# Patient Record
Sex: MALE | Race: WHITE | NOT HISPANIC OR LATINO | Employment: OTHER | URBAN - METROPOLITAN AREA
[De-identification: names, ages, dates, MRNs, and addresses within clinical notes are randomized per-mention and may not be internally consistent; named-entity substitution may affect disease eponyms.]

---

## 2017-01-06 ENCOUNTER — AMBULATORY - HEALTHEAST (OUTPATIENT)
Dept: CARDIOLOGY | Facility: CLINIC | Age: 62
End: 2017-01-06

## 2017-01-06 DIAGNOSIS — I48.3 TYPICAL ATRIAL FLUTTER (H): ICD-10-CM

## 2017-03-10 ENCOUNTER — COMMUNICATION - HEALTHEAST (OUTPATIENT)
Dept: CARDIOLOGY | Facility: CLINIC | Age: 62
End: 2017-03-10

## 2017-03-10 DIAGNOSIS — I48.3 TYPICAL ATRIAL FLUTTER (H): ICD-10-CM

## 2017-06-12 ENCOUNTER — COMMUNICATION - HEALTHEAST (OUTPATIENT)
Dept: CARDIOLOGY | Facility: CLINIC | Age: 62
End: 2017-06-12

## 2017-06-12 DIAGNOSIS — I48.3 TYPICAL ATRIAL FLUTTER (H): ICD-10-CM

## 2017-09-20 ENCOUNTER — COMMUNICATION - HEALTHEAST (OUTPATIENT)
Dept: CARDIOLOGY | Facility: CLINIC | Age: 62
End: 2017-09-20

## 2017-09-20 DIAGNOSIS — I48.3 TYPICAL ATRIAL FLUTTER (H): ICD-10-CM

## 2017-11-06 ENCOUNTER — COMMUNICATION - HEALTHEAST (OUTPATIENT)
Dept: CARDIOLOGY | Facility: CLINIC | Age: 62
End: 2017-11-06

## 2017-11-06 DIAGNOSIS — I48.3 TYPICAL ATRIAL FLUTTER (H): ICD-10-CM

## 2017-11-09 ENCOUNTER — COMMUNICATION - HEALTHEAST (OUTPATIENT)
Dept: CARDIOLOGY | Facility: CLINIC | Age: 62
End: 2017-11-09

## 2017-11-09 DIAGNOSIS — I48.3 TYPICAL ATRIAL FLUTTER (H): ICD-10-CM

## 2017-12-19 ENCOUNTER — OFFICE VISIT - HEALTHEAST (OUTPATIENT)
Dept: CARDIOLOGY | Facility: CLINIC | Age: 62
End: 2017-12-19

## 2017-12-19 DIAGNOSIS — I10 ESSENTIAL HYPERTENSION: ICD-10-CM

## 2017-12-19 DIAGNOSIS — I48.3 TYPICAL ATRIAL FLUTTER (H): ICD-10-CM

## 2017-12-19 RX ORDER — KETOCONAZOLE 20 MG/G
CREAM TOPICAL PRN
Status: SHIPPED | COMMUNITY
Start: 2017-11-06 | End: 2022-04-21

## 2017-12-19 ASSESSMENT — MIFFLIN-ST. JEOR: SCORE: 1722.93

## 2018-01-26 ENCOUNTER — OFFICE VISIT - HEALTHEAST (OUTPATIENT)
Dept: CARDIOLOGY | Facility: CLINIC | Age: 63
End: 2018-01-26

## 2018-01-26 ENCOUNTER — TRANSFERRED RECORDS (OUTPATIENT)
Dept: HEALTH INFORMATION MANAGEMENT | Facility: CLINIC | Age: 63
End: 2018-01-26

## 2018-01-26 DIAGNOSIS — I10 ESSENTIAL HYPERTENSION: ICD-10-CM

## 2018-01-26 DIAGNOSIS — I48.3 TYPICAL ATRIAL FLUTTER (H): ICD-10-CM

## 2018-01-26 ASSESSMENT — MIFFLIN-ST. JEOR: SCORE: 1763.3

## 2018-02-27 ENCOUNTER — OFFICE VISIT - HEALTHEAST (OUTPATIENT)
Dept: CARDIOLOGY | Facility: CLINIC | Age: 63
End: 2018-02-27

## 2018-02-27 ENCOUNTER — TRANSFERRED RECORDS (OUTPATIENT)
Dept: HEALTH INFORMATION MANAGEMENT | Facility: CLINIC | Age: 63
End: 2018-02-27

## 2018-02-27 DIAGNOSIS — I48.3 TYPICAL ATRIAL FLUTTER (H): ICD-10-CM

## 2018-02-27 DIAGNOSIS — I10 ESSENTIAL HYPERTENSION: ICD-10-CM

## 2018-02-27 ASSESSMENT — MIFFLIN-ST. JEOR: SCORE: 1768.29

## 2018-03-02 ENCOUNTER — TRANSFERRED RECORDS (OUTPATIENT)
Dept: HEALTH INFORMATION MANAGEMENT | Facility: CLINIC | Age: 63
End: 2018-03-02

## 2018-03-02 ENCOUNTER — AMBULATORY - HEALTHEAST (OUTPATIENT)
Dept: CARDIOLOGY | Facility: CLINIC | Age: 63
End: 2018-03-02

## 2018-03-05 ENCOUNTER — AMBULATORY - HEALTHEAST (OUTPATIENT)
Dept: CARDIOLOGY | Facility: CLINIC | Age: 63
End: 2018-03-05

## 2018-03-05 ENCOUNTER — COMMUNICATION - HEALTHEAST (OUTPATIENT)
Dept: CARDIOLOGY | Facility: CLINIC | Age: 63
End: 2018-03-05

## 2018-03-05 ENCOUNTER — SURGERY - HEALTHEAST (OUTPATIENT)
Dept: CARDIOLOGY | Facility: CLINIC | Age: 63
End: 2018-03-05

## 2018-03-05 DIAGNOSIS — I48.3 TYPICAL ATRIAL FLUTTER (H): ICD-10-CM

## 2018-03-12 ENCOUNTER — AMBULATORY - HEALTHEAST (OUTPATIENT)
Dept: CARDIOLOGY | Facility: CLINIC | Age: 63
End: 2018-03-12

## 2018-03-23 ENCOUNTER — SURGERY - HEALTHEAST (OUTPATIENT)
Dept: CARDIOLOGY | Facility: CLINIC | Age: 63
End: 2018-03-23

## 2018-03-23 ASSESSMENT — MIFFLIN-ST. JEOR: SCORE: 1779.25

## 2018-03-27 ENCOUNTER — AMBULATORY - HEALTHEAST (OUTPATIENT)
Dept: CARDIOLOGY | Facility: CLINIC | Age: 63
End: 2018-03-27

## 2018-03-27 DIAGNOSIS — Z86.79 STATUS POST ABLATION OF ATRIAL FLUTTER: ICD-10-CM

## 2018-03-27 DIAGNOSIS — Z98.890 STATUS POST ABLATION OF ATRIAL FLUTTER: ICD-10-CM

## 2018-03-27 ASSESSMENT — MIFFLIN-ST. JEOR: SCORE: 1747.88

## 2018-06-26 ENCOUNTER — OFFICE VISIT - HEALTHEAST (OUTPATIENT)
Dept: CARDIOLOGY | Facility: CLINIC | Age: 63
End: 2018-06-26

## 2018-06-26 DIAGNOSIS — I10 ESSENTIAL HYPERTENSION: ICD-10-CM

## 2018-06-26 DIAGNOSIS — Z98.890 STATUS POST CATHETER ABLATION OF ATRIAL FLUTTER: ICD-10-CM

## 2018-06-26 DIAGNOSIS — E78.2 MIXED HYPERLIPIDEMIA: ICD-10-CM

## 2018-06-26 DIAGNOSIS — I48.3 TYPICAL ATRIAL FLUTTER (H): ICD-10-CM

## 2018-06-26 ASSESSMENT — MIFFLIN-ST. JEOR: SCORE: 1766.02

## 2018-06-27 ENCOUNTER — COMMUNICATION - HEALTHEAST (OUTPATIENT)
Dept: CARDIOLOGY | Facility: CLINIC | Age: 63
End: 2018-06-27

## 2018-06-27 DIAGNOSIS — E78.00 HYPERCHOLESTEREMIA: ICD-10-CM

## 2018-08-02 ENCOUNTER — AMBULATORY - HEALTHEAST (OUTPATIENT)
Dept: LAB | Facility: CLINIC | Age: 63
End: 2018-08-02

## 2018-08-02 ENCOUNTER — HOSPITAL ENCOUNTER (OUTPATIENT)
Dept: CT IMAGING | Facility: CLINIC | Age: 63
Discharge: HOME | End: 2018-08-02
Attending: INTERNAL MEDICINE

## 2018-08-02 DIAGNOSIS — E78.00 HYPERCHOLESTEREMIA: ICD-10-CM

## 2018-08-02 LAB
CHOLEST SERPL-MCNC: 229 MG/DL
CV CALCIUM SCORE AGATSTON LM: 0
CV CALCIUM SCORING AGATSON LAD: 15
CV CALCIUM SCORING AGATSTON CX: 0
CV CALCIUM SCORING AGATSTON RCA: 7
CV CALCIUM SCORING AGATSTON TOTAL: 22
FASTING STATUS PATIENT QL REPORTED: YES
HDLC SERPL-MCNC: 46 MG/DL
LDLC SERPL CALC-MCNC: 145 MG/DL
TRIGL SERPL-MCNC: 188 MG/DL

## 2018-08-27 ENCOUNTER — COMMUNICATION - HEALTHEAST (OUTPATIENT)
Dept: CARDIOLOGY | Facility: CLINIC | Age: 63
End: 2018-08-27

## 2019-01-07 ENCOUNTER — COMMUNICATION - HEALTHEAST (OUTPATIENT)
Dept: CARDIOLOGY | Facility: CLINIC | Age: 64
End: 2019-01-07

## 2019-01-07 DIAGNOSIS — I48.3 TYPICAL ATRIAL FLUTTER (H): ICD-10-CM

## 2019-08-19 ENCOUNTER — COMMUNICATION - HEALTHEAST (OUTPATIENT)
Dept: CARDIOLOGY | Facility: CLINIC | Age: 64
End: 2019-08-19

## 2019-08-19 DIAGNOSIS — I48.3 TYPICAL ATRIAL FLUTTER (H): ICD-10-CM

## 2019-08-23 ENCOUNTER — COMMUNICATION - HEALTHEAST (OUTPATIENT)
Dept: CARDIOLOGY | Facility: CLINIC | Age: 64
End: 2019-08-23

## 2019-08-23 DIAGNOSIS — I48.3 TYPICAL ATRIAL FLUTTER (H): ICD-10-CM

## 2019-10-04 ENCOUNTER — OFFICE VISIT - HEALTHEAST (OUTPATIENT)
Dept: CARDIOLOGY | Facility: CLINIC | Age: 64
End: 2019-10-04

## 2019-10-04 DIAGNOSIS — I48.3 TYPICAL ATRIAL FLUTTER (H): ICD-10-CM

## 2019-10-04 DIAGNOSIS — E78.2 MIXED HYPERLIPIDEMIA: ICD-10-CM

## 2019-10-04 ASSESSMENT — MIFFLIN-ST. JEOR: SCORE: 1752.75

## 2019-10-10 ENCOUNTER — COMMUNICATION - HEALTHEAST (OUTPATIENT)
Dept: CARDIOLOGY | Facility: CLINIC | Age: 64
End: 2019-10-10

## 2019-10-10 DIAGNOSIS — E78.2 MIXED HYPERLIPIDEMIA: ICD-10-CM

## 2019-10-10 LAB
CHOLEST SERPL-MCNC: 200 MG/DL
FASTING STATUS PATIENT QL REPORTED: YES
HDLC SERPL-MCNC: 56 MG/DL
LDLC SERPL CALC-MCNC: 117 MG/DL
TRIGL SERPL-MCNC: 137 MG/DL

## 2019-10-22 ENCOUNTER — COMMUNICATION - HEALTHEAST (OUTPATIENT)
Dept: CARDIOLOGY | Facility: CLINIC | Age: 64
End: 2019-10-22

## 2019-10-25 ENCOUNTER — AMBULATORY - HEALTHEAST (OUTPATIENT)
Dept: CARDIOLOGY | Facility: CLINIC | Age: 64
End: 2019-10-25

## 2019-10-25 DIAGNOSIS — Z00.6 EXAMINATION OF PARTICIPANT OR CONTROL IN CLINICAL RESEARCH: ICD-10-CM

## 2019-10-25 DIAGNOSIS — Z00.6 EXAM FOR CLINICAL TRIAL: ICD-10-CM

## 2019-10-25 DIAGNOSIS — I48.3 TYPICAL ATRIAL FLUTTER (H): ICD-10-CM

## 2019-10-25 LAB
ATRIAL RATE - MUSE: 70 BPM
DIASTOLIC BLOOD PRESSURE - MUSE: NORMAL
INTERPRETATION ECG - MUSE: NORMAL
P AXIS - MUSE: 39 DEGREES
PR INTERVAL - MUSE: 142 MS
QRS DURATION - MUSE: 76 MS
QT - MUSE: 376 MS
QTC - MUSE: 406 MS
R AXIS - MUSE: 5 DEGREES
SYSTOLIC BLOOD PRESSURE - MUSE: NORMAL
T AXIS - MUSE: 20 DEGREES
VENTRICULAR RATE- MUSE: 70 BPM

## 2019-10-25 ASSESSMENT — MIFFLIN-ST. JEOR: SCORE: 1758.76

## 2019-10-28 ENCOUNTER — COMMUNICATION - HEALTHEAST (OUTPATIENT)
Dept: CARDIOLOGY | Facility: CLINIC | Age: 64
End: 2019-10-28

## 2019-10-28 ENCOUNTER — AMBULATORY - HEALTHEAST (OUTPATIENT)
Dept: CARDIOLOGY | Facility: CLINIC | Age: 64
End: 2019-10-28

## 2019-11-07 ENCOUNTER — COMMUNICATION - HEALTHEAST (OUTPATIENT)
Dept: CARDIOLOGY | Facility: CLINIC | Age: 64
End: 2019-11-07

## 2019-11-07 DIAGNOSIS — I48.3 TYPICAL ATRIAL FLUTTER (H): ICD-10-CM

## 2020-01-24 ENCOUNTER — COMMUNICATION - HEALTHEAST (OUTPATIENT)
Dept: CARDIOLOGY | Facility: CLINIC | Age: 65
End: 2020-01-24

## 2020-01-24 DIAGNOSIS — E78.2 MIXED HYPERLIPIDEMIA: ICD-10-CM

## 2020-04-15 ENCOUNTER — COMMUNICATION - HEALTHEAST (OUTPATIENT)
Dept: CARDIOLOGY | Facility: CLINIC | Age: 65
End: 2020-04-15

## 2020-04-15 DIAGNOSIS — I10 ESSENTIAL HYPERTENSION: ICD-10-CM

## 2020-05-06 ENCOUNTER — COMMUNICATION - HEALTHEAST (OUTPATIENT)
Dept: CARDIOLOGY | Facility: CLINIC | Age: 65
End: 2020-05-06

## 2020-05-06 DIAGNOSIS — E78.2 MIXED HYPERLIPIDEMIA: ICD-10-CM

## 2020-07-27 ENCOUNTER — COMMUNICATION - HEALTHEAST (OUTPATIENT)
Dept: CARDIOLOGY | Facility: CLINIC | Age: 65
End: 2020-07-27

## 2020-07-27 DIAGNOSIS — I48.3 TYPICAL ATRIAL FLUTTER (H): ICD-10-CM

## 2020-10-21 ENCOUNTER — OFFICE VISIT - HEALTHEAST (OUTPATIENT)
Dept: CARDIOLOGY | Facility: CLINIC | Age: 65
End: 2020-10-21

## 2020-10-21 DIAGNOSIS — Z98.890 STATUS POST CATHETER ABLATION OF ATRIAL FLUTTER: ICD-10-CM

## 2020-10-21 DIAGNOSIS — E78.2 MIXED HYPERLIPIDEMIA: ICD-10-CM

## 2020-10-21 DIAGNOSIS — I48.3 TYPICAL ATRIAL FLUTTER (H): ICD-10-CM

## 2020-10-21 DIAGNOSIS — R93.1 ELEVATED CORONARY ARTERY CALCIUM SCORE: ICD-10-CM

## 2020-10-21 RX ORDER — ROSUVASTATIN CALCIUM 10 MG/1
TABLET, COATED ORAL
Qty: 90 TABLET | Refills: 3 | Status: SHIPPED | OUTPATIENT
Start: 2020-10-21 | End: 2022-04-21

## 2020-10-21 RX ORDER — METOPROLOL SUCCINATE 50 MG/1
50 TABLET, EXTENDED RELEASE ORAL DAILY
Qty: 90 TABLET | Refills: 3 | Status: SHIPPED | OUTPATIENT
Start: 2020-10-21 | End: 2021-10-25

## 2020-10-21 ASSESSMENT — MIFFLIN-ST. JEOR: SCORE: 1766.02

## 2021-05-31 VITALS — BODY MASS INDEX: 31.1 KG/M2 | HEIGHT: 69 IN | WEIGHT: 210 LBS

## 2021-06-01 VITALS — WEIGHT: 212 LBS | BODY MASS INDEX: 30.35 KG/M2 | HEIGHT: 70 IN

## 2021-06-01 VITALS — WEIGHT: 220 LBS | HEIGHT: 69 IN | BODY MASS INDEX: 32.58 KG/M2

## 2021-06-01 VITALS — WEIGHT: 218.9 LBS | BODY MASS INDEX: 32.42 KG/M2 | HEIGHT: 69 IN

## 2021-06-01 VITALS — HEIGHT: 70 IN | WEIGHT: 216 LBS | BODY MASS INDEX: 30.92 KG/M2

## 2021-06-01 VITALS — BODY MASS INDEX: 31.34 KG/M2 | HEIGHT: 70 IN | WEIGHT: 218.92 LBS

## 2021-06-02 NOTE — TELEPHONE ENCOUNTER
"Subject wanted to know what \"time confirmed might watch on \" in the journal meant.     Eamon Del Castillo  "

## 2021-06-02 NOTE — TELEPHONE ENCOUNTER
Zestar Study Consent Visit    Study description: ECG and PPG Study: Zestar Study      Juan Carlos Huang a 63 y.o. male , was contacted by today to discuss participation in the Zestar study.     The patient called the Clinical Trials Office to inquire about study participation.  The consent form was reviewed with the patient.     The review of the study included:    Study purpose     Conflict of interest    Device description      Study visits    Risks of participation    Benefits (if any)    Alternatives    Voluntary participation    Confidentiality     Compensation/costs of participation    Study stipends    Injury and legal rights    The subject was queried in regards to his willingness to continue and come in for scheduled appointment. his questions were answered to his satisfaction.   The patient has given his preliminary agreement to volunteer to participate in the above noted study.     Plan: Juan Carlos Huang will come to [facility] on 10/25/2019  to continue consent process. If he continues to agrees to participate, the study visit will be done on the same day.    Josey Levin RN

## 2021-06-02 NOTE — TELEPHONE ENCOUNTER
Patient contacted with results of recent lipid panel and recommendations as per Dr. DAVID Fabian for Crestor at 20mg daily dose and f/u lipid panel in 3 months.     He was advised of LDL goal of less than 70 , does not currently tolerate the Crestor 10mg dose, as he is experiencing muscle and joint aches that he attributes to this use.  He states that he will try to increase to the 20mg daily dose, but does not feel it will be a positive experience. Would like to see if Dr. Fabian would look into one of the new cholesterol lowering medications such as Repatha, to see if he could take that and what his cost might be. He is close to changing over to Medicare and so this is a consideration as well.     Dr. Fabian, any new recommendations at this time? sk/RN

## 2021-06-02 NOTE — TELEPHONE ENCOUNTER
----- Message from Higinio Fabian MD sent at 10/10/2019  1:12 PM CDT -----  Gisell,    Would increase Crestor to 20 mg a day and repeat lipid panel in 3 months.    Thanks,    Higinio

## 2021-06-03 VITALS
TEMPERATURE: 98.8 F | RESPIRATION RATE: 15 BRPM | SYSTOLIC BLOOD PRESSURE: 123 MMHG | DIASTOLIC BLOOD PRESSURE: 88 MMHG | BODY MASS INDEX: 33.56 KG/M2 | WEIGHT: 221.4 LBS | HEIGHT: 68 IN | HEART RATE: 75 BPM

## 2021-06-03 VITALS
WEIGHT: 219 LBS | RESPIRATION RATE: 16 BRPM | HEART RATE: 76 BPM | DIASTOLIC BLOOD PRESSURE: 64 MMHG | HEIGHT: 68 IN | SYSTOLIC BLOOD PRESSURE: 114 MMHG | BODY MASS INDEX: 33.19 KG/M2

## 2021-06-05 VITALS
RESPIRATION RATE: 14 BRPM | HEIGHT: 68 IN | HEART RATE: 76 BPM | BODY MASS INDEX: 33.8 KG/M2 | WEIGHT: 223 LBS | SYSTOLIC BLOOD PRESSURE: 124 MMHG | DIASTOLIC BLOOD PRESSURE: 81 MMHG

## 2021-06-15 NOTE — PROGRESS NOTES
"[facility]    Assessment/Plan:      Problem List Items Addressed This Visit     HTN (hypertension) (Chronic)    Typical atrial flutter - Primary    Relevant Medications    flecainide (TAMBOCOR) 100 MG tablet        1.  Typical atrial flutter: He has had 4-6 symptomatic episodes of arrhythmia over the past year that have responded to flecainide 100 mg pill in the pocket.  His only documentation of arrhythmia was in 2015.  We discussed the physiology, natural progression, and differences between right and left atrial arrhythmias.  We also discussed treatment options including antiarrhythmic medications versus ablation.  He is potentially interested in pursuing ablation, but timing is uncertain as he will be without insurance for about a year and a half after he retires.  Continue metoprolol succinate 50 mg daily with flecainide pill in the pocket.    He was reassured that atrial arrhythmias are not life-threatening, but does carry increased risk for stroke.  His [id number] score is 1 for hypertension; continue daily aspirin.      2.  Hypertension: Blood pressure at target today.    Follow-up with Dr. Padron or Dr. Michelle to discuss treatment options.    Subjective:      Juan Carlos Huang, a very pleasant 62-year-old gentleman comes in today for follow-up of atrial flutter.  He was newly diagnosed with atrial flutter with rapid ventricular response when he presented to the emergency department on 12/13/15 with symptoms of chest tightness, lightheadedness, diaphoresis, and intermittent dyspnea on exertion for approximately 2 days.  EKG documented typical atrial flutter with rapid ventricular response at 129 bpm.  He has a [id number] score of 1 for hypertension, but is also \"prediabetes\".  He underwent TANYA guided cardioversion on 12/15/15 with significant symptomatic improvement.  Prior to cardioversion, the telemetry strips demonstrated morphology consistent with typical atrial flutter.  He was initially " on a heparin drip but switched over to Eliquis 5 mg twice a day prior to discharge which was continued for 1 month following cardioversion and switched to low-dose daily aspirin.  He has continued to have occasional brief episodes that initially terminated shortly after taking an extra metoprolol, now with flecainide pill in the pocket.  He underwent right hip replacement on 1/8/2018.    Aki states that he continues to feel well.  He has had 4 or 6 episodes over the past year in which he felt arrhythmia starting and took flecainide 100 mg, shortly thereafter returning to a normal heartbeat.  He denies chest discomfort, abdominal bloating/fullness or peripheral edema, shortness of breath, paroxysmal nocturnal dyspnea, orthopnea, lightheadedness, dizziness, pre-syncope, or syncope.  He is inquiring about possible ablation, particularly as he will be retiring soon and will likely be without insurance until he is eligible for Medicare when he turns 64.    Medical, surgical, family, social history, and medications were reviewed and updated as necessary.    Patient Active Problem List   Diagnosis     Typical atrial flutter     HTN (hypertension)     HLD (hyperlipidemia)     Pre-diabetes     Obesity (BMI 30.0-34.9)       Past Medical History:   Diagnosis Date     Arrhythmia     new atrial flutter 12/2015     HLD (hyperlipidemia) 12/13/2015     HTN (hypertension) 12/13/2015     Pre-diabetes        Past Surgical History:   Procedure Laterality Date     CARDIOVERSION  12/15     CARPAL TUNNEL RELEASE Bilateral      EXPLORATORY LAPAROTOMY      x 2     KNEE SURGERY Bilateral     arthoscopic and open, ?meniscal repair     ROTATOR CUFF REPAIR Bilateral        Allergies   Allergen Reactions     Atorvastatin Myalgia       Current Outpatient Prescriptions   Medication Sig Dispense Refill     aspirin 81 mg chewable tablet Chew 1 tablet (81 mg total) daily. (Patient taking differently: Chew 325 mg daily. )  0     cholecalciferol,  "vitamin D3, 5,000 unit Tab Take 5,000 Units by mouth daily.       flecainide (TAMBOCOR) 100 MG tablet Take 100 mg within an hour of going into atrial flutter and repeat 100 mg in 6 hrs if needed.  Then call for med instructions. 30 tablet 6     ketoconazole (NIZORAL) 2 % cream Apply topically as needed.       metoprolol succinate (TOPROL-XL) 50 MG 24 hr tablet Take 1 tablet (50 mg total) by mouth daily. 90 tablet 3     traMADol (ULTRAM) 50 mg tablet Take 50 mg by mouth as needed.       No current facility-administered medications for this visit.        Family History   Problem Relation Age of Onset     Diabetes type II Mother      Cancer Mother      Diabetes type II Brother        Social History   Substance Use Topics     Smoking status: Never Smoker     Smokeless tobacco: Never Used     Alcohol use 4.2 oz/week     7 Cans of beer per week       Review of Systems:   General: WNL  Eyes: WNL  Ears/Nose/Throat: WNL  Lungs: WNL  Heart: WNL  Stomach: Heartburn  Bladder: Frequent Urination at Night  Muscle/Joints: WNL  Skin: WNL  Nervous System: WNL  Mental Health: WNL     Blood: WNL      Objective:    /84 (Patient Site: Left Arm, Patient Position: Sitting, Cuff Size: Adult Regular)  Pulse 64  Resp 16  Ht 5' 9\" (1.753 m)  Wt 218 lb 14.4 oz (99.3 kg)  BMI 32.33 kg/m2    Physical Exam  General Appearance:  Alert, well-appearing, in no acute distress.     HEENT:  Atraumatic, normocephalic; no scleral icterus, EOM intact, PERRL; the mucous membranes were pink and moist.   Chest: Chest symmetric, spine straight.   Lungs:   Respirations unlabored; the lungs are clear to auscultation.   Cardiovascular:   Auscultation reveals normal first and second heart sounds with no murmurs, rubs, or gallops.  Regular rate and rhythm.  Radial and posterior tibial pulses are intact.    Abdomen:  Soft, nontender, nondistended, bowel sounds present.     Extremities: No cyanosis, clubbing, or edema.  Wearing right compression stocking. "   Musculoskeletal:  Moves all extremities.  Ambulating with cane.   Skin: No xanthelasma.   Neurologic: Mood and affect are appropriate. Oriented to person, place, time, and situation.       Cardiographics (personally reviewed)  ECG 2017 shows sinus rhythm at 62 bpm  EC/15/15 was personally reviewed and shows sinus rhythm at 79 bpm, QT/QTc interval measures 362/415 ms, consistent with manual measurement.  ECG 2015 shows typical atrial flutter with 2: 1 conduction at 129 bpm    Echocardiogram: Done 12/14/15  Normal left ventricular size and wall thickness.  No regional wall motion abnormalities.  LVEF 62%.  Normal size left atrium.  No Significant valvular disease.    TANYA done 12/15/15 also showed no evidence of left atrial thrombus or PFO/ASD.    NM exercise stress test done 2015 is negative for inducible myocardial ischemia or infarction.  LVEF 61%.    Lab Review   BMP, CBC, PT/INR done 17 at [facility] reviewed, unremarkable      Greater than than 30 minutes were spent face to face in this visit with more than 50% spent discussing diagnoses as listed above, counseling, and coordination of care.        Margie Rankin, [facility], Electrophysiology  [phone]    This note has been dictated using voice recognition software. Any grammatical, typographical, or context distortions are unintentional and inherent to the software.

## 2021-06-16 NOTE — TELEPHONE ENCOUNTER
Telephone Encounter by Ana Laura Key RN at 10/11/2019  1:13 PM     Author: Ana Laura Key RN Service: -- Author Type: Registered Nurse    Filed: 10/11/2019  1:22 PM Encounter Date: 10/10/2019 Status: Signed    : Ana Laura Key RN (Registered Nurse)       Higinio Fabian MD Keune, Shelly A, RN   Caller: Unspecified (Yesterday,  1:39 PM)             We can sure look at Repatha. Would not go up on dose if he is having SE's?        LM for patient today that we will not increase dose on Crestor to 20mg daily, to continue Crestor 10mg daily for now, meanwhile will send through request for Repatha and see how coverage is for this injectable for him. Sk/RN    GILBERTI to Dr. Rui mcgrath/RN

## 2021-06-16 NOTE — PROGRESS NOTES
1955  [phone] (home)  There is no such number on file (mobile).    +++Important patient information for CSC/Cath Lab staff : None+++    Arrival time given to pt:    [facility] EP Cath Lab Procedure Order   Ablation Type:  Atrial Flutter, Typical  Specific location of pathway: Right    Diagnosis:  Aflutter  Anticipated Case Duration:  2-3;  Ok to double up  Scheduling Needs/Timeframe:  Pt would like MarchPt will need to start Novel anticoagulant 1 week prior to ablation    MD Preference: Dr Vito PHAM Assist:  No Specific MD:  N/A    Current Device: None None  Device Company/Device Rep Needed for Procedure: None    Pre-Procedural Testing needed: None  Mapping System Required:  OSCAR  ICE Needed:  No  Anesthesia:  None  Research Protocol:  No    [facility] EP Cath Lab Prep   Ordering Provider: Dr Pardon  Ordering Date: 3/2/2018  Orders Status: Intial order placed and Order set placed  EP NC Contact: Katheryn Fabian RN    H&P:  Compled by Vito  on 2-27-18 or with PMD  PCP: Jagdeep Mendenhall MD, [phone]    Pre-op Labs: Ordered AM of procedure    Medical Records Pertinent for Procedure:  Stress test 12-31-15 EF 61%;   Cardioversion 12-15-15, TANYA 12-15-15 EF 60%, Echo 12-14-15  and EKG 12-15-15 SR @79,  12-15-15 Aflutter @129    Patient Education:    Teach with Patient: Completed via phone prior to procedure, and letter was also sent to pt via mail/[employer] with pre-procedural instructions    Risks Reviewed:        Cardiac Catheter Ablation    <1% risk for the following: hypotension, hemorrhage, vascular injury including perforation of vein, artery or heart, thrombophlebitis, systemic or pulmonic emboli; cardiac perforation, (tamponade), infection, pneumothorax, arrhythmias, proarrhythmic effects of drugs, radiation exposure.    1-2% complete heart block (for AVNRT or septol accessory pathway).    <0.5% CVA or MI    <0.1% death    If external defibrillation is needed, 75% risk for superficial burn.    1-2% tamponade and  aortic puncture with left sided transeptal approach for left side OSCAR - increase risk of CVA to <2%.    Late arrhythmia recurrences depends upon the primary rhythm disturbance.      Consent: Will be obtained in [facility] day of procedure    Pre-Procedure Instructions that were Reviewed with Patient:  NPO after midnight, Notified patient of time and date of procedure by CV , Transportation arrangements needed s/p procedure, Post-procedure follow up process, Sedation plan/orders and Pre-procedure letter was sent to pt by CV     Pre-Procedure Medication Instructions:  Instructions given to pt regarding anticoagulants: Pt to start novel anticoagulant one week prior and for 4 weeks post.- instructed to continue anticoagulation uninterrupted through their procedure  Instructions given to pt regarding antiarrhythmic medication: Flecainide; Pt instructed to hold 2days prior to procedure  Instructions for medication, other than anticoagulants/antiarrhythmics listed above, given to pt: to take all morning medications with small sips of water, with the exception of OTC supplements and MVI    Allergies   Allergen Reactions     Atorvastatin Myalgia       Current Outpatient Prescriptions:      aspirin 81 mg chewable tablet, Chew 1 tablet (81 mg total) daily., Disp: , Rfl: 0     cholecalciferol, vitamin D3, 5,000 unit Tab, Take 5,000 Units by mouth daily., Disp: , Rfl:      flecainide (TAMBOCOR) 100 MG tablet, Take 100 mg within an hour of going into atrial flutter and repeat 100 mg in 6 hrs if needed.  Then call for med instructions., Disp: 30 tablet, Rfl: 6     ketoconazole (NIZORAL) 2 % cream, Apply topically as needed., Disp: , Rfl:      metoprolol succinate (TOPROL-XL) 50 MG 24 hr tablet, Take 1 tablet (50 mg total) by mouth daily., Disp: 90 tablet, Rfl: 3     ranitidine (ZANTAC) 150 MG tablet, Take 150 mg by mouth as needed., Disp: , Rfl:      traMADol (ULTRAM) 50 mg tablet, Take 50 mg by mouth as needed., Disp:  , Rfl:

## 2021-06-16 NOTE — PROGRESS NOTES
Pt comes to clinic for suture removal post right sided Atrial flutter ablation with Dr. Padron on 3-23-18.  Pt states he is feeling well post ablation.  Pt denies chest pain or shortness of breath.  He has been eating and drinking well, doing normal activities but is not exercising with his normal routine yet.    Pt right groin has 2 puncture sites, is C/D/I, no drainage, slight bruising noted around puncture site, 1 suture intact, groin is soft, and pt denies pain at the site.  Left groin has 2 puncture sites, is C/D/I, no drainage, slight amount of bruising around puncture site, has 1 suture intact, groin is soft, and pt denies pain at the site.  Pt has strong femoral and pedal pulses present.  Sutures were removed from both the left/right groin sites  without complication and sites were left open to air.    Pt continues anticoagulation, Eliquis, q 12 hours.    Reviewed post op PVI healing process, f/u apts, physical restrictions, nutritional requirements, when to contact EP RN/EP MD, and contact information was given to the pt for further concerns or questions.  Pt verbalized understanding.

## 2021-06-18 NOTE — PROGRESS NOTES
[facility]  Arrhythmia Clinic  Gustavo Padron    Referring:      Assessment:         Typical atrial flutter: The patient is 3 months out following ablation of his typical atrial flutter.  He has done well with no clinical recurrence of atrial flutter or other significant palpitations.  The patient has had no ECG documented recurrence of atrial flutter.  At this point I would consider him cured of his atrial flutter.  I did discuss with him once again that he is at increased risk for developing atrial fibrillation in the future.  At the present time with a low [id number] score he is off oral anticoagulant therapy.  I have asked him to screen his pulse rate daily to ensure that he does not miss any atrial fibrillation which might not be associated with direct symptoms.    Coronary artery risk: The patient has increased risk for coronary artery disease.  He has known elevated lipids but has been intolerant of statin therapy on 2 separate occasions with simvastatin and atorvastatin.  I have suggested that we consider obtaining a coronary calcium score to better risk stratify the patient particularly as a pertains to medical therapy for his dyslipidemia.    Hypertension: The patient's blood pressure today is at target on his current medical therapy.      Recommendations:    The patient will be contacted by the EP nurse to obtain any of his previous laboratory testing especially as a pertains to cardiac and/or carotid ultrasounds and/or coronary calcium scoring.    No change in current medications at this time pending review of the above.    If the patient does not have recent laboratories or clear-cut negative testing for coronary calcium score I would recommend that he consider a coronary calcium score.    Instructed the patient to obtain a automated blood pressure cuff to follow his blood pressure but his heart rate on a daily basis.    Follow-up in the A. fib clinic with the EP nurse practitioner  in 6 months.        Patient Active Problem List   Diagnosis     Typical atrial flutter (H)     HTN (hypertension)     HLD (hyperlipidemia)     Pre-diabetes     Obesity (BMI 30.0-34.9)     Status post catheter ablation of atrial flutter       Subjective:  Juan Carlos Huang (62 y.o. male) returns to the arrhythmia clinic for interval reevaluation of his atrial dysrhythmias post ablation.  The patient underwent ablation of typical right atrial flutter approximate 3 months ago.  His recovery was uneventful.  The patient reports occasional minor skips lasting a second or 2 but no prolonged episodes of palpitations or racing heart.  He does not check his pulse on a regular basis but has not been aware of any change in status.  He will return to full activities without limitation.  He reports no lightheadedness presyncope or syncope.  He is not having any exertional dyspnea or chest pain.  He has no orthopnea, PND, or ankle edema.  His general health care status has not changed    Current Outpatient Prescriptions   Medication Sig Dispense Refill     aspirin 81 MG EC tablet Take 81 mg by mouth daily.       BACILLUS COAGULANS (DIGESTIVE ADVANTAGE ORAL) Take 1 tablet by mouth daily.       cholecalciferol, vitamin D3, 5,000 unit Tab Take 5,000 Units by mouth daily.       ketoconazole (NIZORAL) 2 % cream Apply topically as needed.       metoprolol succinate (TOPROL-XL) 50 MG 24 hr tablet Take 1 tablet (50 mg total) by mouth daily. 90 tablet 3     multivitamin therapeutic tablet Take 1 tablet by mouth daily.       ranitidine (ZANTAC) 150 MG tablet Take 150 mg by mouth as needed.       No current facility-administered medications for this visit.        Review of Systems:   General: WNL  Eyes: WNL  Ears/Nose/Throat: WNL  Lungs: WNL  Heart: WNL  Stomach: WNL  Bladder: WNL  Muscle/Joints: WNL  Skin: WNL  Nervous System: WNL  Mental Health: WNL     Blood: WNL    Family History  Family History   Problem Relation Age of Onset     Diabetes  "type II Mother      Kidney cancer Mother 86     Other Father 57     MVA     Dyslipidemia Brother      No Medical Problems Sister      COPD Sister 60     Liver disease Sister 58     Diabetes type II Brother      Hepatitis Brother      Alcoholism Brother 50       Social History   reports that he has never smoked. He has never used smokeless tobacco. He reports that he drinks alcohol. He reports that he does not use illicit drugs.    Objective:   Vital signs in last 24 hours:  /62 (Patient Site: Left Arm, Patient Position: Sitting, Cuff Size: Adult Large)  Pulse 88  Resp 16  Ht 5' 10\" (1.778 m)  Wt 216 lb (98 kg)  BMI 30.99 kg/m2  Weight: Weight: 216 lb (98 kg)     Physical Exam:  General: The patient is alert oriented to person place and situation.  The patient is in no acute distress at the time of my evaluation.  Eyes: Pupils are equal, round, and reactive to light.  Conjunctiva and sclera are clear.  ENT: Oral mucosa is moist and without redness. No evident nasal discharge.  Pulmonary: Lungs are clear bilaterally with no rales, rhonchi, or wheezes.    Cardiovascular exam: Rhythm is regular. S1 and S2 are normal. No significant murmur is present. JVP is normal. Lower extremities demonstrate no significant edema. Distal pulses are intact bilaterally.  Abdomen is flat, soft, and nontender.  Musculoskeletal: Spine is straight. Extremities without deformity.  Neuro: Gait is normal.   Skin is warm, dry, and otherwise intact.      Cardiographics:       Lab Results:   Lab Results   Component Value Date     03/23/2018    K 3.9 03/23/2018     03/23/2018    CO2 29 03/23/2018    BUN 19 03/23/2018    CREATININE 0.78 03/23/2018    CALCIUM 9.3 03/23/2018     Lab Results   Component Value Date    WBC 6.6 03/23/2018    HGB 12.8 (L) 03/23/2018    HCT 39.2 (L) 03/23/2018    MCV 91 03/23/2018     03/23/2018     Lab Results   Component Value Date    INR 0.98 12/14/2015         Outside record " review:

## 2021-06-25 NOTE — PROGRESS NOTES
Progress Notes by Higinio Fabian MD at 12/19/2017  8:50 AM     Author: Higinio Fabian MD Service: -- Author Type: Physician    Filed: 12/19/2017  9:20 AM Encounter Date: 12/19/2017 Status: Signed    : Higinio Fabian MD (Physician)           Click to link to [facility] HEART CARE NOTE    Thank you, Dr. Mendenhall, for asking us to see Juan Carlos Huang at the [facility].      Assessment/Recommendations   Patient with known history of atrial flutter as well as hypertension.  His blood pressure is not optimally controlled today and we have asked him to increase his metoprolol to 50 mg a day and he will call us with some blood pressure readings.  He does have a cuff at home.    We will set him up for a follow-up in our A. fib/flutter clinic and later in January after his hip replacement.  At this time he could discuss the opportunity for atrial flutter ablation.    Thank you for allowing us to participate in his care.         History of Present Illness    Mr. Juan Carlos Huang is a 62 y.o. male with known history of atrial flutter as well as hypertension.  He has been feeling well this past year with only minimal episodes of palpitations.  He has not had any prolonged atrial flutter.  He has been exercising with a workout routine at his home nearly every day without limitations.  His blood pressure has been high in a couple readings.  He has not had any chest discomfort although does get some heartburn.  He does not get chest discomfort with physical activity and denies any shortness of breath with activity.  He denies orthopnea, paroxysmal nocturnal dyspnea, peripheral edema, syncope or near syncopal episodes.  He needs to have a hip replacement which is scheduled for January.  He would like to consider having an ablation next year.         Physical Examination Review of Systems   Vitals:    12/19/17 0908   BP: 138/88   Pulse:    Resp:      Body mass index is 31.01  kg/(m^2).  Wt Readings from Last 3 Encounters:   12/19/17 210 lb (95.3 kg)   09/29/16 207 lb (93.9 kg)   12/23/15 221 lb (100.2 kg)     General Appearance:   Alert, cooperative and in no acute distress.   ENT/Mouth: Oral mucuos membranes pink and moist .      EYES:  No scleral icterus. No Xanthelasma.    Neck: JVP normal. No Hepatojugular reflux. Thyroid not visualized   Chest/Lungs:   Lungs are clear to auscultation, equal chest wall expansion    Cardiovascular:   S1, S2 without murmur ,clicks or rubs. Brachial, radial and posterior tibial pulses are intact and symetric. No carotid bruits noted   Abdomen:  Nontender. BS+. No bruits.      Extremities: No cyanosis, clubbing or edema   Skin: no xanthelasma, warm.    Psych: Appropriate affect.   Neurologic: normal gait, normal  bilateral, no tremors        General: WNL  Eyes: WNL  Ears/Nose/Throat: WNL  Lungs: WNL  Heart: WNL  Stomach: Heartburn  Bladder: Frequent Urination at Night  Muscle/Joints: WNL  Skin: WNL  Nervous System: WNL  Mental Health: WNL     Blood: WNL     Medical History  Surgical History Family History Social History   Past Medical History:   Diagnosis Date   ? Arrhythmia     new atrial flutter 12/2015   ? HLD (hyperlipidemia) 12/13/2015   ? HTN (hypertension) 12/13/2015   ? Pre-diabetes     Past Surgical History:   Procedure Laterality Date   ? CARDIOVERSION  12/15   ? CARPAL TUNNEL RELEASE Bilateral    ? EXPLORATORY LAPAROTOMY      x 2   ? KNEE SURGERY Bilateral     arthoscopic and open, ?meniscal repair   ? ROTATOR CUFF REPAIR Bilateral     Family History   Problem Relation Age of Onset   ? Diabetes type II Mother    ? Cancer Mother    ? Diabetes type II Brother     Social History     Social History   ? Marital status:      Spouse name: N/A   ? Number of children: N/A   ? Years of education: N/A     Occupational History   ? Not on file.     Social History Main Topics   ? Smoking status: Never Smoker   ? Smokeless tobacco: Never Used   ?  Alcohol use 4.2 oz/week     7 Cans of beer per week   ? Drug use: No   ? Sexual activity: Not on file     Other Topics Concern   ? Not on file     Social History Narrative          Medications  Allergies   Current Outpatient Prescriptions   Medication Sig Dispense Refill   ? aspirin 81 mg chewable tablet Chew 1 tablet (81 mg total) daily.  0   ? celecoxib (CELEBREX) 100 MG capsule Take 100 mg by mouth as needed.     ? cholecalciferol, vitamin D3, 5,000 unit Tab Take 5,000 Units by mouth daily.     ? flecainide (TAMBOCOR) 100 MG tablet Take 150 mg within an hour of going into atrial flutter and repeat 100 mg in 6 hrs if needed.  Then call for med instructions. 5 tablet 0   ? ketoconazole (NIZORAL) 2 % cream Apply topically as needed.     ? metoprolol succinate (TOPROL-XL) 50 MG 24 hr tablet Take 1 tablet (50 mg total) by mouth daily. 90 tablet 3   ? traMADol (ULTRAM) 50 mg tablet Take 50 mg by mouth as needed.     ? atorvastatin (LIPITOR) 20 MG tablet Take 20 mg by mouth daily.        No current facility-administered medications for this visit.       No Known Allergies      Lab Results    Chemistry/lipid CBC Cardiac Enzymes/BNP/TSH/INR   Lab Results   Component Value Date    CHOL 196 12/14/2015    HDL 41 12/14/2015    LDLCALC 119 12/14/2015    TRIG 181 (H) 12/14/2015    CREATININE 0.82 12/14/2015    BUN 13 12/14/2015    K 4.0 12/14/2015     12/14/2015     12/14/2015    CO2 26 12/14/2015    Lab Results   Component Value Date    WBC 8.5 12/14/2015    HGB 14.3 12/14/2015    HCT 42.6 12/14/2015    MCV 89 12/14/2015     12/15/2015    Lab Results   Component Value Date    TROPONINI 0.17 12/15/2015    TSH 4.15 12/13/2015    INR 0.98 12/14/2015

## 2021-06-26 NOTE — PROGRESS NOTES
"Progress Notes by Gustavo Padron MD at 2/27/2018  3:50 PM     Author: Gustavo Padron MD Service: -- Author Type: Physician    Filed: 2/27/2018  5:11 PM Encounter Date: 2/27/2018 Status: Signed    : Gustavo Padron MD (Physician)           Click to link to [facility] HEART CARE NOTE    Thank you, , for asking the [facility] Heart Care team to see Mr. Juan Carlos Huang to evaluate recurrent typical right atrial flutter.      Assessment/Recommendations   Assessment:      Symptomatic typical right atrial flutter: The patient is a documented recurrence of typical right atrial flutter with cardioversion on one occasion in the past.  The patient has not had any documented atrial fibrillation.  The patient currently is maintained on flecainide therapy, but would like to get off of this medication and has therefore requested a discussion about ablation options.  The patient is a good candidate with a low risk for any significant complication.  The risks benefits and expected outcomes were discussed with the patient in detail and he would like to proceed.    Hypertension: The patient's blood pressure is at target on his current medical therapy    Plan:    The patient will consider moving forward with right atrial cava tricuspid isthmus ablation (flutter ablation) and be contacted by Katheryn Fabian RN later this week.    The patient flecainide will need to avoid using flecainide in the 48 hours preceding his ablation.    I would recommend that the patient take a novel oral anticoagulant beginning a week before his procedure and for 4 weeks post procedure.                 History of Present Illness/Subjective    Mr. Juan Carlos Huang is a 62 y.o. male with history of atrial flutter dating back to 2015.  The patient's symptoms included a sensation of \"heart racing\" and significant lightheadedness as well as mild shortness of breath.  The patient has required cardioversion on one occasion " in 2015.  Subsequent episodes of tachypalpitations have spontaneously reverted.  The patient reports no exertional dyspnea or chest pain.  He recently underwent hip replacement surgery and has been undergoing rehab with no other difficulties.    ECG: Twelve-lead EKG from 2015 is reviewed and reveals typical right atrial flutter with 2:1 block.  Sinus rhythm is documented post cardioversion with normal intervals.  Personally reviewed.     ECHO:   Echocardiogram dated December 2015  TTE procedure:ECHO COMPLETE.      Procedure Date  Date: 12/14/2015 Start: 10:23 AM     Study Location: [facility]  Technical Quality: Adequate visualization     Patient Status: Routine     Height: 69 inches Weight: 217 pounds BSA: 2.14 m^2 BMI: 32.05 kg/m^2     HR: 122 bpm BP: 105/72 mmHg     Indications  Atrial fibrillation.      Conclusions       Summary   Normal left ventricular size and wall thickness.   Left ventricular ejection fraction is calculated to be 62%.   No regional wall motion abnormalities.   No significant valve disease    Other Studies:        Physical Examination Review of Systems   Vitals:    02/27/18 1551   BP: 117/74   Pulse: 92     Body mass index is 32.49 kg/(m^2).  Wt Readings from Last 3 Encounters:   02/27/18 220 lb (99.8 kg)   01/26/18 218 lb 14.4 oz (99.3 kg)   12/19/17 210 lb (95.3 kg)     [unfilled]    General     Appearance:   The patient is alert oriented to person place and situation.    The patient is in no acute distress at the time of my evaluation.   HEENT:  Pupils are equal, round, and reactive to light.  Conjunctiva and sclera are clear.  ENT: Oral mucosa is moist and without redness. No evident nasal discharge.   Neck: Without palpable thyroid or appreciable lymph nodes.   Chest: Symmetric, without deformity.   Lungs:   Clear bilaterally with no rales, rhonchi, or wheezes.     Cardiovascular:   Rhythm is regular. S1 and S2 are normal. No significant murmur is present. JVP is normal. Lower  extremities demonstrate no significant edema. Distal pulses are intact bilaterally.   Abdomen:  Abdomen is obese, soft, and nontender.   Extremities: Without deformity.   Skin: Skin is warm, dry, and otherwise intact.   Neurologic: Gait is normal.           A 12 point comprehensive review of systems was  negative except as noted.      Medical History  Surgical History Family History Social History   Past Medical History:   Diagnosis Date   ? Arrhythmia     new atrial flutter 12/2015   ? HLD (hyperlipidemia) 12/13/2015   ? HTN (hypertension) 12/13/2015   ? Pre-diabetes     Past Surgical History:   Procedure Laterality Date   ? CARDIOVERSION  12/15   ? CARPAL TUNNEL RELEASE Bilateral    ? EXPLORATORY LAPAROTOMY      x 2   ? KNEE SURGERY Bilateral     arthoscopic and open, ?meniscal repair   ? ROTATOR CUFF REPAIR Bilateral     Family History   Problem Relation Age of Onset   ? Diabetes type II Mother    ? Kidney cancer Mother 86   ? Other Father 57     MVA   ? Dyslipidemia Brother    ? No Medical Problems Sister    ? COPD Sister 60   ? Liver disease Sister 58   ? Diabetes type II Brother    ? Hepatitis Brother    ? Alcoholism Brother 50    Social History     Social History   ? Marital status:      Spouse name: N/A   ? Number of children: 2   ? 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 Smoking status: Never Smoker   ? Smokeless tobacco: Never Used   ? Alcohol use Yes      Comment: 1-2 cans of beer per day   ? Drug use: No   ? Sexual activity: No     Other Topics Concern   ? Not on file     Social History Narrative          Medications  Allergies   Scheduled Meds:  Current Outpatient Prescriptions   Medication Sig Dispense Refill   ? aspirin 81 mg chewable tablet Chew 1 tablet (81 mg total) daily.  0   ? cholecalciferol, vitamin D3, 5,000 unit Tab Take 5,000 Units by mouth daily.     ? flecainide (TAMBOCOR) 100 MG tablet Take 100 mg within an  hour of going into atrial flutter and repeat 100 mg in 6 hrs if needed.  Then call for med instructions. 30 tablet 6   ? ketoconazole (NIZORAL) 2 % cream Apply topically as needed.     ? metoprolol succinate (TOPROL-XL) 50 MG 24 hr tablet Take 1 tablet (50 mg total) by mouth daily. 90 tablet 3   ? ranitidine (ZANTAC) 150 MG tablet Take 150 mg by mouth as needed.     ? traMADol (ULTRAM) 50 mg tablet Take 50 mg by mouth as needed.       No current facility-administered medications for this visit.     Allergies   Allergen Reactions   ? Atorvastatin Myalgia         Lab Results    Chemistry/lipid CBC Cardiac Enzymes/BNP/TSH/INR   Lab Results   Component Value Date    CHOL 196 12/14/2015    HDL 41 12/14/2015    LDLCALC 119 12/14/2015    TRIG 181 (H) 12/14/2015    CREATININE 0.82 12/14/2015    BUN 13 12/14/2015    K 4.0 12/14/2015     12/14/2015     12/14/2015    CO2 26 12/14/2015    Lab Results   Component Value Date    WBC 8.5 12/14/2015    HGB 14.3 12/14/2015    HCT 42.6 12/14/2015    MCV 89 12/14/2015     12/15/2015    Lab Results   Component Value Date    TROPONINI 0.17 12/15/2015    TSH 4.15 12/13/2015    INR 0.98 12/14/2015

## 2021-06-26 NOTE — PROGRESS NOTES
Progress Notes by Monica Fabian, RN at 3/12/2018  3:34 PM     Author: Monica Fabian RN Service: -- Author Type: Registered Nurse    Filed: 3/12/2018  3:40 PM Encounter Date: 3/12/2018 Status: Signed    : Monica Fabian RN (Registered Nurse)       Noted.  Phone call to patient.  He received prescription and directions to start on 3-16-18 via letter in the mail.    Pt will start on 3-16-18.  Reviewed contact information for further questions.      MD Monica Johnson RN Caroline,   He can start any of the NOAC's.   Thanks   Gustavo            Previous Messages       ----- Message -----      From: Monica Fabian RN      Sent: 3/5/2018   3:15 PM        To: MD Dr. Vito Johnson,   This patient is scheduled for a typical flutter ablation with you on 3-23-18.  The patient was seen in clinic with you on 2-27-18, you wanted him to start a novel anticoagulant one week prior and for 4 weeks after.  Would you like him to start Eliquis?   Thank you,   monica

## 2021-06-28 NOTE — PROGRESS NOTES
Progress Notes by Eamon Del Castillo at 10/25/2019  8:00 AM     Author: Eamon Del Castillo Service: -- Author Type: Patient Access    Filed: 10/25/2019 10:59 AM Encounter Date: 10/25/2019 Status: Signed    : Eamon Del Castillo (Patient Access)              Zestar Study Consent Visit    Study description: ECG and PPG Study: Zestar Study      Note time seated: 0815     Juan Carlos Huang a 63 y.o. male , was seen in [facility] today to discuss participation in the Zestar study.   The consent discussion began on 22 Oct 2019.  Please refer to phone call note from Josey Levin for more details.  The consent form was reviewed with the patient.     The review of the study included:    Study purpose     Conflict of interest    Device description      Study visits    Risks of participation    Benefits (if any)    Alternatives    Voluntary participation    Confidentiality     Compensation/costs of participation    Study stipends    Injury and legal rights    The subject was provided time to review the consent form and consider participation. his questions were answered to his satisfaction.   The patient has voluntarily agreed to participate in the above noted study.     The consent form version 25 Sep 2019 and HIPAA form version 11 Jun 2019 was signed 10/25/19 at 0828    The subject was provided with a copy of the consent form and HIPPA. A copy of the signed forms was forwarded to medical records.    No study procedures were done prior to Juan Carlos Huang providing informed consent.     Eamon Del Castillo    Subject Restrictions During Study -Confirmed with Subject prior to any study procedures completed    Restrictions on jewelry, recreational drugs, caffeine, and exercise few days prior and during study.   1. Subjects should not consume excessive amount of caffeine (6 or more 8-oz cups of coffee, or more than 570 mg of caffeine from energy drinks, pills or similar substance) during their participation in the study.   2. Subjects should not  "consume excessive amount of alcohol for the duration of their participation in the study. A typical moderate amount is allowed during stage 3.   3. Subjects should not take any recreational drugs (including, but not limited to methamphetamines, cocaine, opioids, cannabis, LSD) for the duration of their participation in the study.   4. Subjects should not wear underwire bra or jewelry during the in-lab study (to not interfere with electrode placement and ECG data recordings).   5. Subject will not be permitted to have their cell phone or any electronic recording device on or with them during the in-lab test session(s).   6. Subjects under 22 years old will not be permitted to take ECG recordings through the ECG derrick on the wrist-worn devices.     For study stage 3 only   1. Subjects should only do high intensity exercise (e.g. sprinting, heavy lifting, etc.) in the morning upon awakening or else not at all   2. Subjects should abstain from swimming during the time of the study   3. Subjects should only shower in the morning upon awakening (or else not at all)   4. Female subjects are strongly suggested to wear non-underwire bras throughout this stage of the study     Eamon Del Castillo      Study Data collections   Vitals  (TPBP)     Vitals:    10/25/19 0831 10/25/19 0834 10/25/19 0837   BP: 130/86 116/83 123/88   Patient Site: Left Arm Left Arm Left Arm   Patient Position: Sitting Sitting Sitting   Cuff Size: Adult Regular Adult Regular Adult Regular   Pulse: 75 76 75   Resp: 15     Temp: 98.8  F (37.1  C)     TempSrc: Oral     Weight:   221 lb 6.4 oz (100.4 kg)   Height:   5' 8\" (1.727 m)      VS taken after 5 min rest     MAP 1    94  MAP 2      94   MAP 3             95          Body mass index is 33.66 kg/m .  male  1955  63 y.o.      Note time patient placed in supine position: 0840    Ethnicity   []   or    [x]  Not  or   Race   []   or    []    []  " Black or   []   or Other   [x]  White  Physical Activity Level  per subject report:   []  0- Extremely Inactive []  1- Sedentary []  2- Moderately Active  [x]  3- Vigorously Active []  4- Extremely Active  Trained Athlete   [] Yes  [x] No     Crowell's' Skin type   [] Type 1 [] Type 2 [x] Type 3    [] Type 4 [] Type 5 [] Type 6    Subject participated in previous ECG study at [facility]: [] Yes    [x] No    Past Medical History:   Diagnosis Date   ? Atrial flutter (H) 12/2015    new atrial flutter 12/2015   ? Heartburn unknown   ? History of cardioversion 12/2015   ? HLD (hyperlipidemia) 12/13/2015   ? HTN (hypertension) 12/13/2015   ? Pre-diabetes unknown   ? Status post catheter ablation of atrial flutter 6/26/2018    RA CTI ablation line March 23, 2018       HISTORY OF HEART RHYTHM ABNORMALITIES - check all that apply  []  None   [x]  Atrial Flutter  [] Frequent PACs (>3 in 30 secs)  [] AF (permanent)  []  Tachycardia  [] Bigeminy, trigeminy, and/or quadgeminy  []  AF (persistent)  []  Heart Block   []  AF (paroxysmal)  [] PVCs    [] AF (other)    [] BBB    []  Other:   How many years? 4  Special interest allergies: active allergic skin reactions  Allergies   Allergen Reactions   ? Atorvastatin Myalgia   ? Statins-Hmg-Coa Reductase Inhibitors      Other reaction(s): Myalgias  Statin intolerance       Current Outpatient Medications:   ?  aspirin 81 MG EC tablet, Take 81 mg by mouth daily., Disp: , Rfl:   ?  BACILLUS COAGULANS (DIGESTIVE ADVANTAGE ORAL), Take 1 tablet by mouth daily., Disp: , Rfl:   ?  cholecalciferol, vitamin D3, 5,000 unit Tab, Take 5,000 Units by mouth daily., Disp: , Rfl:   ?  evolocumab 140 mg/mL PnIj, Inject 140 mg under the skin every 14 (fourteen) days., Disp: 6 Syringe, Rfl: 4  ?  ketoconazole (NIZORAL) 2 % cream, Apply topically as needed., Disp: , Rfl:   ?  metoprolol succinate (TOPROL-XL) 50 MG 24 hr tablet, Take 1 tablet (50 mg total)  "by mouth daily., Disp: 90 tablet, Rfl: 3  ?  multivitamin therapeutic tablet, Take 1 tablet by mouth daily., Disp: , Rfl:   ?  ranitidine (ZANTAC) 150 MG tablet, Take 150 mg by mouth as needed., Disp: , Rfl:   ?  rosuvastatin (CRESTOR) 10 MG tablet, Take 1 tablet (10 mg total) by mouth at bedtime., Disp: 30 tablet, Rfl: 1      10-sec 12-lead ECG & 30-sec 12-lead ECG rhythm strip done; reviewed by & PE done by Kelley Avila  Subject Questionnaire    OCCUPATION: Retired    Predominately works outdoors  [] Yes    [x] No      Hours/week spent outdoors (total, not only for work): 21    Frequently participates in \"hand intensive\" activities [] Yes [x] No  Caffeine  []  Never  [] Occasionally        [x]  Daily (1 cup/day)     []  Daily (>1 cup/day)    Alcohol   []  Never  [] Light (drink or 2 occasionally) [x]  Moderate (a drink or 2 almost daily)   []  Occasional-heavy (more than a few drinks <2x / month)  [] Heavy (more than a few drinks >2x / month)    Tobacco/nicotine  [x]  Never  [] Rarely  []  Frequently/ Daily     Mattress Information  [] Subject did not participate in Stage 3  Mattress type:  []  Memory foam [] Gel  [x] Innerspring (coil)  [] Airbed  [] Waterbed [] Shikibuton  [] Hybrid  [] No mattress  [] Other (comment):    Mattress foundation   [] Mattress on floor/ground    [] Mattress on foundation/box spring on floor/ground  [x] Mattress on foundation/box spring on bed frame  [] Mattress on tatami on floor/ground  [] Other (comment):    Mattress topper   [x] No mattress topper  [] Pillow top  [] Foam top - flat style  [] Foam top - \"egg crate\" style  [] Other (comment):    Co-sleeper    []  Yes  [x]  No    CPAP use   [] Yes  [x] No      Dominant hand [] left  [x] right [] ambidextrous  Preferred Wrist to wear band on   [x]  left   []  right   Were screening day & study day: [x]  same [] different   Same: wrist circumference:      180   mm     Device wearing wrist skin fold thickness:       4.4  mm  Wrist " Band Size:     []  Flush Fit S/M  []  Non-Flush Fit S/M   [x]  Flush Fit M/L  []  Non-Flush Fit M/L  []  Flush Fit XL  []  Non-Flush Fit XL    Preferred/natural band notch: 4  Secure band notch: 4  Crown orientation:  []  left   [x]  right  Device wearing wrist hairiness:     [x]  Light []  Medium       []  Heavy  Spectophotometer   L A B   Reading #1  59.99  9.62  18.12   Reading #2  58.70  10.03  18.42   Reading #3  58.48  10.16  18.57     Pregnancy test  for WOCBP    [x] n/a male or female not child bearing potential  Room Temperature ( C): 21  CS Laptop ID: [id number]  CS Cam ID:[id number]  Device Set ID:[id number]  Wrist Device ID:[id number]  Subject has now completed their in-house participation in the Zestar study. Subject will complete Stage 3 at home for the next 3 days and return the equipment on 10/28/19.  Eamon Del Castillo

## 2021-06-28 NOTE — PROGRESS NOTES
Progress Notes by Eamon Del Castillo at 10/25/2019  8:00 AM     Author: Eamon Del Castillo Service: -- Author Type: Patient Access    Filed: 10/26/2019  7:18 PM Encounter Date: 10/25/2019 Status: Signed    : Gustavo Padron MD (Physician)    Related Notes: Original Note by Eamon Del Castillo (Patient Access) filed at 10/25/2019 10:59 AM           [facility] Study Inclusion / Exclusion Criteria  Protocol Version 2.0 ([id number])    Inclusion Criteria    Yes No Criteria # Subject must meet all inclusion criteria:      [x]    []  1 At least 18 years old for NSR and 22 years old for Non-NSR. Inclusive for both cohorts, at time of screening, with no upper limit on age.        [x]      []  2 To be enrolled as a non-NSR subject the volunteer must have one of the following conditions: Permanent/Persistent AF, Hx of paroxysmal AF, Hx of High-rate AF, AF + rate control medication, Hx Atrial Flutter, PVC burden >1%, Frequent PACs, Hx BBB, Hx 2nd degree block (any type), Hx Bigeminy/Trigeminy/Quadgeminy, Hx Tachycardia. For subjects with any of the following diagnoses, the condition must be present at the time of screening:   ? Permanent/persistent AF  ? PVC Central  ? Frequent PACs   Note: If not present at screening, subjects may not be enrolled as a non-NSR subject or NSR subject.         3  [x] N/A HE site For Subjects to be enrolled as NSR they must be in NSR at the time of screening as determined by the investigator. For subjects with occasional ectopic beats (<1% burden during screening), they should still qualify as individuals in the NSR cohort as long as they don't have a medical history/diagnosis of significant ectopic burden.    [x]  []  4 Able to read and understand a written ICF    [x]  []  5 Willing and able to participate in the study procedures and comply with its restrictions    [x]  []  6 Able to communicate effectively with study staff as well as understand and follow directions    All must be Yes    Exclusion  Criteria-all must be no  Yes No Criteria # Subject must not meet any exclusion criteria:    []  [x]  1 Physical disability that prevents safe and adequate testing.   []  [x]  2 Pregnant women or women planning to become pregnant   []  [x]  3 Any acute illness or condition that may interfere with study procedures (e.g. cough, fever, sore throat, headache, sunburn, etc.)   []  [x]  4 Clinically significant hand tremors, as judged by the Investigator   []  [x]  5 Resting hypertension with systolic blood pressure ?161 mmHg or diastolic blood pressure ?101 mmHg (if at least 2 of 3 measurements meet this criteria)   []  [x]  6 Subjects with implanted cardiac devices such as a pacemaker or an automated implantable cardioverter- defibrillator (AICD)   []  [x]  7 Acute myocardial infarction (MI) within 90 days from the screening visit   []  [x]  8 Other cardiovascular disease that increases the risk to the subject or would render the data uninterpretable in the opinion of the Investigator (e.g., recent or ongoing unstable angina, significant valvular heart disease or chronic heart failure, myocarditis or pericarditis)    []  [x]  9 Acute pulmonary embolism, pulmonary infarction, or deep vein thrombosis within 90 days from the screening visit    []  [x]  10 Stroke or transient ischemic attack within 90 days from the screening visit    []  [x]  11 Known untreated medical conditions as determined by the Investigator, such as but not limited to significant anemia, important electrolyte imbalance and untreated or uncontrolled thyroid disease.    []  [x]  12 Any history of wrist surgery with scarring in the area of the sensor location on the wrist where the subject will be wearing the watch;    []  [x]  13 Open wound(s) on the wrist and forearm where the subject will be wearing the watch    []  [x]  14 Severe symptomatic (or active) overly dry/injured skin, skin disorders, or allergic skin reactions such as eczema, rosacea,  impetigo, dermatomyositis or allergic contact dermatitis on wrist and locations where the electrodes will be placed (e.g. chest, forearms, stomach), as determined by the investigator.    []  [x]  15 Tattoos, scars or moles in the area of the sensor location on the wrist where the subject will be wearing the watch    []  [x]  16 Device wearing Wrist circumference ? 129 mm or ? 246 mm    []  [x]  17 Known significant sensitivity to medical adhesives or isopropyl alcohol (for ECG electrode placement)    []  [x]  18 Known allergy or sensitivity to fluorocarbon-based synthetic rubber, such as contact dermatitis with fluoroelastomer bands primarily used in wrist worn fitness devices    []  [x]  19 Subjects with any Medical History, Physical exam, vital sign or any other study procedure finding/assessment that in the opinion of the investigator could compromise subject safety during study participation or interfere with the study integrity and/or the accurate assessment of the study objectives    []  [x]  20 Subject works for a company that develops or sells medical and/or fitness devices (e.g., ECG monitors, wearable fitness bands, sleep monitors, etc.) or are technology journalists (e.g., professional bloggers, TV, magazine, newspaper reporters, etc.)    []  [x]  21 Weight > 181 kg for subjects using the stationary bike and/or treadmill. Weight of ?138 kg for NSR subjects.    []  [x]  22 Subject is employed in shift work, or otherwise does not maintain a reasonably consistent day/night schedule (e.g. Subjects who go to bed after 4am).    []  [x]  23 Overnight travel planned during data collection nights    []  [x]  24 Non-NSR subjects should not have partaken in strenuous physical activity within 12 hours prior to screening    []  [x]  25 Non-NSR subjects with Atrial fibrillation categories: Subjects taking Class 1 or Class 3 antiarrhythmic agents such as the following may not take part in any stage of the study:  amiodarone, sotalol, dronedarone, ibutilide, dofetilide, propafenone, quinidine, procainamide, disopyramide, flecainide (Subjects taking class 2, 4 or 5 antiarrhythmic agents may take part the study).    []  [x]  26 Subjects who have both a history of paroxysmal AF and a Crowell skin type measurement of VI    []  [x]  27 Subjects who have missing index fingers on both hands      Patient meets inclusion criteria.  Patient has no exclusion criteria.    Eamon Del Castillo

## 2021-06-28 NOTE — PROGRESS NOTES
Progress Notes by Smiley Schwarz at 10/28/2019  9:30 AM     Author: Smiley Schwarz Service: -- Author Type: Research Associate    Filed: 10/28/2019  9:31 AM Encounter Date: 10/28/2019 Status: Signed    : Smiley Schwarz (Research Associate)         [facility] Study Device Return    Juan Carlos Huang returned all the devices for the [facility] study.  Juan Carlos Huang denies medication changes or adverse events since last visit.    Juan Carlos Huang has now completed their participation in the [facility] study.   Thank you for your gift of participation.    Smiley Schwarz

## 2021-06-28 NOTE — PROGRESS NOTES
Progress Notes by Kelley Avila CNP at 10/25/2019  8:00 AM     Author: Kelley Avila CNP Service: -- Author Type: Nurse Practitioner    Filed: 10/25/2019 10:59 AM Encounter Date: 10/25/2019 Status: Signed    : Kelley Avila CNP (Nurse Practitioner)        [facility] Study    Physical Examination  For abnormal findings, please evaluate if the finding is Clinically Significant (by 'CS') or Not Clinically Significant (by 'NCS')  General Appearance Normal  Head and Neck  Normal  Lungs    Normal  Cardiovascular  Normal  Abdomen   Normal  Musculoskeletal/Extremities Normal   Lymph Nodes  Normal  Skin    Normal  Neurological   Normal   Tremor present NCS fine tremor on the right hand +1    If present, evaluate severity on 1-10 scale    Kelley Avila CNP

## 2021-06-28 NOTE — PROGRESS NOTES
Progress Notes by Higinio Fabian MD at 10/4/2019  4:10 PM     Author: Higinio Fabian MD Service: -- Author Type: Physician    Filed: 10/4/2019  5:06 PM Encounter Date: 10/4/2019 Status: Signed    : Higinio Fabian MD (Physician)           Click to link to [facility] HEART CARE NOTE    Thank you, Dr. Montenegro, for asking us to see Juan Carlos Huang at the [facility].      Assessment/Recommendations   Patient with known hyperlipidemia as well as elevated calcium score and a history of atrial flutter which is should be dormant after ablation.    I have encouraged him to maintain an active lifestyle.  He is active and does some calisthenics but I have asked him to do a an aerobic type exercise for 20 to 30 minutes at least 5 times a week and he will take that under advisement.    We will get a fasting lipid panel to see how he is doing on his new medicine which is Crestor 10 mg each day.    Thank you for allowing us to participate in his care.         History of Present Illness    Mr. Juan Carlos Huang is a 63 y.o. male with known hyperlipidemia, hypertension, and a history of atrial flutter.  He had a flutter ablation and that is worked very well for him he has had no rapid heartbeats.  His breathing is been good and he maintains an active lifestyle and denies any chest discomfort.  He did have a elevated calcium score and hyperlipidemia and he been on our atorvastatin but was switched to Crestor in March of this year.  LDL cholesterol was not at goal at that time.    He has not had any chest discomfort and also denies orthopnea, paroxysmal nocturnal dyspnea, peripheral edema, syncope or near syncopal episodes or palpitations.  He gets a little achy from the Crestor.         Physical Examination Review of Systems   Vitals:    10/04/19 1620   BP: 114/64   Pulse: 76   Resp: 16     Body mass index is 33 kg/m .  Wt Readings from Last 3 Encounters:   10/04/19 219 lb (99.3  kg)   06/26/18 216 lb (98 kg)   03/29/18 211 lb (95.7 kg)     General Appearance:   Alert, cooperative and in no acute distress.   ENT/Mouth: Oral mucuos membranes pink and moist .      EYES:  No scleral icterus. No Xanthelasma.    Neck: JVP normal. No Hepatojugular reflux. Thyroid not visualized   Chest/Lungs:   Lungs are clear to auscultation, equal chest wall expansion    Cardiovascular:   S1, S2 without murmur ,clicks or rubs. Brachial, radial and posterior tibial pulses are intact and symetric. No carotid bruits noted   Abdomen:  Nontender. BS+.      Extremities: No cyanosis, clubbing or edema   Skin: no xanthelasma, warm.    Psych: Appropriate affect.   Neurologic: normal gait, normal  bilateral, no tremors        General: WNL  Eyes: WNL  Ears/Nose/Throat: WNL  Lungs: WNL  Heart: WNL  Stomach: Heartburn  Bladder: Frequent Urination at Night  Muscle/Joints: Joint Pain  Skin: WNL  Nervous System: WNL  Mental Health: WNL     Blood: WNL     Medical History  Surgical History Family History Social History   Past Medical History:   Diagnosis Date   ? Arrhythmia     new atrial flutter 12/2015   ? HLD (hyperlipidemia) 12/13/2015   ? HTN (hypertension) 12/13/2015   ? Pre-diabetes    ? Status post catheter ablation of atrial flutter 6/26/2018    RA CTI ablation line March 23, 2018    Past Surgical History:   Procedure Laterality Date   ? CARDIOVERSION  12/15   ? CARPAL TUNNEL RELEASE Bilateral    ? EP ABLATION AFLUTTER Right 3/23/2018    Procedure: EP Ablation Atrial Flutter;  Surgeon: Gustavo Padron MD;  Location: [facility];  Service:    ? EXPLORATORY LAPAROTOMY      x 2   ? KNEE SURGERY Bilateral     arthoscopic and open, ?meniscal repair   ? right total hip  01/08/2018   ? ROTATOR CUFF REPAIR Bilateral     Family History   Problem Relation Age of Onset   ? Diabetes type II Mother    ? Kidney cancer Mother 86   ? Other Father 57        MVA   ? Dyslipidemia Brother    ? No Medical Problems Sister    ?  COPD Sister 60   ? Liver disease Sister 58   ? Diabetes type II Brother    ? Hepatitis Brother    ? Alcoholism Brother 50    Social History     Socioeconomic History   ? Marital status:      Spouse name: Not on file   ? Number of children: 2   ? Years of education: Not on file   ? Highest education level: Not on file   Occupational History   ? Occupation: Disability     Comment: Printer   Social Needs   ? Financial resource strain: Not on file   ? Food insecurity:     Worry: Not on file     Inability: Not on file   ? Transportation needs:     Medical: Not on file     Non-medical: Not on file   Tobacco Use   ? Smoking status: Never Smoker   ? Smokeless tobacco: Never Used   Substance and Sexual Activity   ? Alcohol use: Yes     Comment: 1-2 cans of beer per day   ? Drug use: No   ? Sexual activity: Never   Lifestyle   ? Physical activity:     Days per week: Not on file     Minutes per session: Not on file   ? Stress: Not on file   Relationships   ? Social connections:     Talks on phone: Not on file     Gets together: Not on file     Attends Mosque service: Not on file     Active member of club or organization: Not on file     Attends meetings of clubs or organizations: Not on file     Relationship status: Not on file   ? Intimate partner violence:     Fear of current or ex partner: Not on file     Emotionally abused: Not on file     Physically abused: Not on file     Forced sexual activity: Not on file   Other Topics Concern   ? Not on file   Social History Narrative   ? Not on file          Medications  Allergies   Current Outpatient Medications   Medication Sig Dispense Refill   ? aspirin 81 MG EC tablet Take 81 mg by mouth daily.     ? BACILLUS COAGULANS (DIGESTIVE ADVANTAGE ORAL) Take 1 tablet by mouth daily.     ? cholecalciferol, vitamin D3, 5,000 unit Tab Take 5,000 Units by mouth daily.     ? ketoconazole (NIZORAL) 2 % cream Apply topically as needed.     ? metoprolol succinate (TOPROL-XL) 50 MG 24  hr tablet Take 1 tablet (50 mg total) by mouth daily. 90 tablet 3   ? multivitamin therapeutic tablet Take 1 tablet by mouth daily.     ? ranitidine (ZANTAC) 150 MG tablet Take 150 mg by mouth as needed.       No current facility-administered medications for this visit.       Allergies   Allergen Reactions   ? Atorvastatin Myalgia   ? Statins-Hmg-Coa Reductase Inhibitors      Other reaction(s): Myalgias  Statin intolerance         Lab Results    Chemistry/lipid CBC Cardiac Enzymes/BNP/TSH/INR   Lab Results   Component Value Date    CHOL 229 (H) 08/02/2018    HDL 46 08/02/2018    LDLCALC 145 (H) 08/02/2018    TRIG 188 (H) 08/02/2018    CREATININE 0.78 03/23/2018    BUN 19 03/23/2018    K 3.9 03/23/2018     03/23/2018     03/23/2018    CO2 29 03/23/2018    Lab Results   Component Value Date    WBC 6.6 03/23/2018    HGB 12.8 (L) 03/23/2018    HCT 39.2 (L) 03/23/2018    MCV 91 03/23/2018     03/23/2018    Lab Results   Component Value Date    TROPONINI 0.17 12/15/2015    TSH 4.15 12/13/2015    INR 0.98 12/14/2015

## 2021-06-29 NOTE — PROGRESS NOTES
"Progress Notes by Higinio Fabian MD at 10/21/2020  1:50 PM     Author: Higinio Fabian MD Service: -- Author Type: Physician    Filed: 10/21/2020  2:23 PM Encounter Date: 10/21/2020 Status: Signed    : Higinio Fabian MD (Physician)               Assessment/Recommendations   Patient with mild elevation in calcium score and a history of atrial flutter status post flutter ablation.  He has been doing very well.  He only tolerates Crestor a couple of times a week or every other day because of achiness.  His insurance would not cover the PCSK9 inhibitor.    Overall I think he is doing well.  He is active, his blood pressure is cool, he takes an antiplatelet agent and intermittent statin.    I have encouraged him to maintain an active lifestyle and to decrease carbohydrates in his diet which along with his current activity would likely result in some weight loss.  He will take that under advisement.    I am not changing his medications today.  We will see him back in 1 year, although I encouraged him to call if he has changes in his functional capacity or questions.    Thank you for allowing us to participate in his care.       History of Present Illness/Subjective    Mr. Juan Carlos Huang is a 64 y.o. male with known mild elevation in calcium score, and a history of atrial flutter, status post flutter ablation.  He has been doing well this past year.  He has been very busy.  He does sit ups and calisthenics about 5 times a week.  He is active in between and tells me he \"never sit still\".  He has been very busy today clearing snow, clearing snow off of cars, giving a neighbor ride to the doctor and squeezed in a visit to the cardiology office.    He denies chest discomfort, orthopnea, paroxysmal nocturnal dyspnea, peripheral edema, palpitations, syncope or near syncopal episodes.           Physical Examination Review of Systems   Vitals:    10/21/20 1406   BP: 124/81   Pulse: 76   Resp: 14     Body mass index " is 33.91 kg/m .  Wt Readings from Last 3 Encounters:   10/21/20 (!) 223 lb (101.2 kg)   10/25/19 221 lb 6.4 oz (100.4 kg)   10/04/19 219 lb (99.3 kg)     General Appearance:   Alert, cooperative and in no acute distress.   ENT/Mouth: Oral mucuos membranes pink and moist .      EYES:  no scleral icterus, normal conjunctivae   Neck: JVP normal. No Hepatojugular reflux. Thyroid not visualized.   Chest/Lungs:   Lungs are clear to auscultation, equal chest wall expansion.   Cardiovascular:   S1, S2 without murmur ,clicks or rubs. Brachial, radial and posterior tibial pulses are intact and symetric. No carotid bruits noted   Abdomen:  Nontender.   Extremities: No cyanosis, clubbing or edema   Skin: no xanthelasma, warm.    Neurologic: normal arm motion bilateral, no tremors     Psychiatric: Appropriate affect.                                                  Medical History  Surgical History Family History Social History   Past Medical History:   Diagnosis Date   ? Atrial flutter (H) 12/2015    new atrial flutter 12/2015   ? Heartburn unknown   ? History of cardioversion 12/2015   ? HLD (hyperlipidemia) 12/13/2015   ? HTN (hypertension) 12/13/2015   ? Pre-diabetes unknown   ? Status post catheter ablation of atrial flutter 6/26/2018    RA CTI ablation line March 23, 2018    Past Surgical History:   Procedure Laterality Date   ? CARDIOVERSION  12/15   ? CARPAL TUNNEL RELEASE Bilateral    ? EP ABLATION AFLUTTER Right 3/23/2018    Procedure: EP Ablation Atrial Flutter;  Surgeon: Gustavo Padron MD;  Location: [facility];  Service:    ? EXPLORATORY LAPAROTOMY      x 2   ? KNEE SURGERY Bilateral     arthoscopic and open, ?meniscal repair   ? right total hip  01/08/2018   ? ROTATOR CUFF REPAIR Bilateral     Family History   Problem Relation Age of Onset   ? Diabetes type II Mother    ? Kidney cancer Mother 86   ? Other Father 57        MVA   ? Dyslipidemia Brother    ? No Medical Problems Sister    ? COPD Sister 60    ? Liver disease Sister 58   ? Diabetes type II Brother    ? Hepatitis Brother    ? Alcoholism Brother 50    Social History     Socioeconomic History   ? Marital status:      Spouse name: Not on file   ? Number of children: 2   ? Years of education: Not on file   ? Highest education level: Not on file   Occupational History   ? Occupation: Disability     Comment: Printer   Social Needs   ? Financial resource strain: Not on file   ? Food insecurity     Worry: Not on file     Inability: Not on file   ? Transportation needs     Medical: Not on file     Non-medical: Not on file   Tobacco Use   ? Smoking status: Never Smoker   ? Smokeless tobacco: Never Used   Substance and Sexual Activity   ? Alcohol use: Yes     Comment: 1-2 cans of beer per day   ? Drug use: No   ? Sexual activity: Never   Lifestyle   ? Physical activity     Days per week: Not on file     Minutes per session: Not on file   ? Stress: Not on file   Relationships   ? Social connections     Talks on phone: Not on file     Gets together: Not on file     Attends Church service: Not on file     Active member of club or organization: Not on file     Attends meetings of clubs or organizations: Not on file     Relationship status: Not on file   ? Intimate partner violence     Fear of current or ex partner: Not on file     Emotionally abused: Not on file     Physically abused: Not on file     Forced sexual activity: Not on file   Other Topics Concern   ? Not on file   Social History Narrative   ? Not on file          Medications  Allergies   Current Outpatient Medications   Medication Sig Dispense Refill   ? aspirin 81 MG EC tablet Take 81 mg by mouth daily.     ? cholecalciferol, vitamin D3, 5,000 unit Tab Take 5,000 Units by mouth daily.     ? metoprolol succinate (TOPROL-XL) 50 MG 24 hr tablet Take 1 tablet (50 mg total) by mouth daily. 90 tablet 3   ? multivitamin therapeutic tablet Take 1 tablet by mouth daily.     ? rosuvastatin (CRESTOR) 10 MG  tablet TAKE 1 TABLET BY MOUTH EVERYDAY AT BEDTIME 90 tablet 3   ? BACILLUS COAGULANS (DIGESTIVE ADVANTAGE ORAL) Take 1 tablet by mouth daily.     ? evolocumab 140 mg/mL PnIj Inject 140 mg under the skin every 14 (fourteen) days. 6 Syringe 4   ? ketoconazole (NIZORAL) 2 % cream Apply topically as needed.     ? ranitidine (ZANTAC) 150 MG tablet Take 150 mg by mouth as needed.       No current facility-administered medications for this visit.     Allergies   Allergen Reactions   ? Atorvastatin Myalgia   ? Statins-Hmg-Coa Reductase Inhibitors      Other reaction(s): Myalgias  Statin intolerance         Lab Results    Chemistry/lipid CBC Cardiac Enzymes/BNP/TSH/INR   Lab Results   Component Value Date    CHOL 200 (H) 10/10/2019    HDL 56 10/10/2019    LDLCALC 117 10/10/2019    TRIG 137 10/10/2019    CREATININE 0.78 03/23/2018    BUN 19 03/23/2018    K 3.9 03/23/2018     03/23/2018     03/23/2018    CO2 29 03/23/2018    Lab Results   Component Value Date    WBC 6.6 03/23/2018    HGB 12.8 (L) 03/23/2018    HCT 39.2 (L) 03/23/2018    MCV 91 03/23/2018     03/23/2018    Lab Results   Component Value Date    TROPONINI 0.17 12/15/2015    TSH 4.15 12/13/2015    INR 0.98 12/14/2015

## 2021-10-25 DIAGNOSIS — I48.3 TYPICAL ATRIAL FLUTTER (H): ICD-10-CM

## 2021-10-25 RX ORDER — METOPROLOL SUCCINATE 50 MG/1
50 TABLET, EXTENDED RELEASE ORAL DAILY
Qty: 90 TABLET | Refills: 0 | Status: SHIPPED | OUTPATIENT
Start: 2021-10-25 | End: 2022-01-19

## 2022-01-19 DIAGNOSIS — I48.3 TYPICAL ATRIAL FLUTTER (H): ICD-10-CM

## 2022-01-19 RX ORDER — METOPROLOL SUCCINATE 50 MG/1
50 TABLET, EXTENDED RELEASE ORAL DAILY
Qty: 90 TABLET | Refills: 0 | Status: SHIPPED | OUTPATIENT
Start: 2022-01-19 | End: 2022-01-20

## 2022-01-20 DIAGNOSIS — I48.3 TYPICAL ATRIAL FLUTTER (H): ICD-10-CM

## 2022-01-20 RX ORDER — METOPROLOL SUCCINATE 50 MG/1
50 TABLET, EXTENDED RELEASE ORAL DAILY
Qty: 90 TABLET | Refills: 0 | Status: SHIPPED | OUTPATIENT
Start: 2022-01-20 | End: 2022-06-27

## 2022-04-21 ENCOUNTER — OFFICE VISIT (OUTPATIENT)
Dept: CARDIOLOGY | Facility: CLINIC | Age: 67
End: 2022-04-21
Payer: MEDICARE

## 2022-04-21 VITALS
RESPIRATION RATE: 18 BRPM | HEIGHT: 70 IN | BODY MASS INDEX: 32.3 KG/M2 | DIASTOLIC BLOOD PRESSURE: 70 MMHG | HEART RATE: 75 BPM | WEIGHT: 225.6 LBS | SYSTOLIC BLOOD PRESSURE: 144 MMHG

## 2022-04-21 DIAGNOSIS — I48.3 TYPICAL ATRIAL FLUTTER (H): ICD-10-CM

## 2022-04-21 DIAGNOSIS — E78.5 HYPERLIPIDEMIA LDL GOAL <100: ICD-10-CM

## 2022-04-21 DIAGNOSIS — R93.1 ELEVATED CORONARY ARTERY CALCIUM SCORE: Primary | ICD-10-CM

## 2022-04-21 PROCEDURE — 99213 OFFICE O/P EST LOW 20 MIN: CPT | Performed by: INTERNAL MEDICINE

## 2022-04-21 RX ORDER — EZETIMIBE 10 MG/1
10 TABLET ORAL DAILY
Qty: 90 TABLET | Refills: 3 | Status: SHIPPED | OUTPATIENT
Start: 2022-04-21

## 2022-04-21 NOTE — LETTER
"4/21/2022    Braydon Montenegro MD  [facility] [address]   [facility] [address]    RE: Juan Carlos Huang       Dear Colleague,     I had the pleasure of seeing Juan Carlos Huang in the [facility].      [facility]  [phone]          Assessment/Recommendations   Patient with known history of atrial flutter, status post flutter ablation in 2018 without recurrence.  He also has some mild elevation in calcium score but is been intolerant of statins and his insurance will not pay for PCSK9 inhibitors.  His LDL cholesterol is not at goal.    He exercises on a regular basis, generally 3-5 times a week.  We will try him on Zetia 10 mg a day and repeat a lipid panel in about 3 months.    Encouraged him to continue his exercise routine and to call if he has changes in his functional capacity.    He remains stable we will see him back in a year and a half.    Thank you for allowing us to participate in his care.       History of Present Illness/Subjective    Mr. Juan Carlos Huang is a 66 year old male with known elevated calcium score, and atrial flutter status post flutter ablation.  His fibrillation is in 2018 and he is not had recurrence.  He maintains an active lifestyle.  He lifts weights 3 times a week and walks on the other 2 days.  He has not had any chest discomfort, unusual shortness of breath with activity, orthopnea, paroxysmal nocturnal dyspnea, peripheral edema, syncope, near syncope or palpitations.  LDL cholesterol is not at goal and he does not tolerate statins other than insurance company will not pay for PCSK9 inhibitors.       Physical Examination Review of Systems   BP (!) 144/70 (BP Location: Right arm, Patient Position: Sitting, Cuff Size: Adult Large)   Pulse 75   Resp 18   Ht 1.778 m (5' 10\")   Wt 102.3 kg (225 lb 9.6 oz)   BMI 32.37 kg/m    Body mass index is 32.37 kg/m .  Wt Readings from Last 3 Encounters:   04/21/22 102.3 kg (225 lb " "9.6 oz)   10/21/20 101.2 kg (223 lb)   10/25/19 100.4 kg (221 lb 6.4 oz)     General Appearance:   Alert, cooperative and in no acute distress.   ENT/Mouth: Patient wearing a mask.      EYES:  no scleral icterus, normal conjunctivae   Neck: JVP normal. No Hepatojugular reflux. Thyroid not visualized.   Chest/Lungs:   Lungs are clear to auscultation, equal chest wall expansion.   Cardiovascular:   S1, S2 without murmur ,clicks or rubs. Brachial, radial and posterior tibial pulses are intact and symetric. No carotid bruits noted   Abdomen:  Nontender. BS+.    Extremities: No cyanosis, clubbing or edema   Skin: no xanthelasma, warm.    Neurologic: normal arm movement bilateral, no tremors     Psychiatric: Appropriate affect.      Enc Vitals  BP: (!) 144/70  Pulse: 75  Resp: 18  Weight: 102.3 kg (225 lb 9.6 oz)  Height: 177.8 cm (5' 10\")                                           Medical History  Surgical History Family History Social History   Past Medical History:   Diagnosis Date     Atrial flutter (H) 12/2015    new atrial flutter 12/2015     Heartburn unknown     History of cardioversion 12/2015     HLD (hyperlipidemia) 12/13/2015     HTN (hypertension) 12/13/2015     Pre-diabetes unknown     Status post catheter ablation of atrial flutter 6/26/2018    RA CTI ablation line March 23, 2018    Past Surgical History:   Procedure Laterality Date     ARTHROSCOPY SHOULDER ROTATOR CUFF REPAIR Bilateral      CARDIOVERSION  12/15     EP ABLATION AFLUTTER Right 3/23/2018    Procedure: EP Ablation Atrial Flutter;  Surgeon: Gustavo Padron MD;  Location: [facility];  Service:      KNEE SURGERY Bilateral     arthoscopic and open, ?meniscal repair     LAPAROTOMY EXPLORATORY      x 2     OTHER SURGICAL HISTORY  01/08/2018    right total hip     RELEASE CARPAL TUNNEL Bilateral     Family History   Problem Relation Age of Onset     Diabetes Type 2  Mother      Kidney Cancer Mother 86.00     Other - See Comments Father 57.00 "        MVA     Dyslipidemia Brother      No Known Problems Sister      Chronic Obstructive Pulmonary Disease Sister 60.00     Liver Disease Sister 58.00     Diabetes Type 2  Brother      Hepatitis Brother      Alcoholism Brother 50.00    Social History     Socioeconomic History     Marital status:      Spouse name: Not on file     Number of children: 2     Years of education: Not on file     Highest education level: Not on file   Occupational History     Not on file   Tobacco Use     Smoking status: Never Smoker     Smokeless tobacco: Never Used   Substance and Sexual Activity     Alcohol use: Yes     Comment: Alcoholic Drinks/day: 1-2 cans of beer per day     Drug use: No     Sexual activity: Never   Other Topics Concern     Not on file   Social History Narrative     Not on file     Social Determinants of Health     Financial Resource Strain: Not on file   Food Insecurity: Not on file   Transportation Needs: Not on file   Physical Activity: Not on file   Stress: Not on file   Social Connections: Not on file   Intimate Partner Violence: Not on file   Housing Stability: Not on file          Medications  Allergies   Current Outpatient Medications   Medication Sig Dispense Refill     aspirin 81 MG EC tablet [ASPIRIN 81 MG EC TABLET] Take 81 mg by mouth daily.       cholecalciferol, vitamin D3, 5,000 unit Tab [CHOLECALCIFEROL, VITAMIN D3, 5,000 UNIT TAB] Take 5,000 Units by mouth daily.       ezetimibe (ZETIA) 10 MG tablet Take 1 tablet (10 mg) by mouth daily 90 tablet 3     metoprolol succinate ER (TOPROL-XL) 50 MG 24 hr tablet Take 1 tablet (50 mg) by mouth daily 90 tablet 0     multivitamin therapeutic tablet [MULTIVITAMIN THERAPEUTIC TABLET] Take 1 tablet by mouth daily.       ranitidine (ZANTAC) 150 MG tablet [RANITIDINE (ZANTAC) 150 MG TABLET] Take 150 mg by mouth as needed.      Allergies   Allergen Reactions     Atorvastatin Muscle Pain (Myalgia)     Statins-Hmg-Coa Reductase Inhibitors [Hmg-Coa-R  Inhibitors] Unknown     Other reaction(s): Myalgias, Statin intolerance         Lab Results    Chemistry/lipid CBC Cardiac Enzymes/BNP/TSH/INR   Lab Results   Component Value Date    CHOL 200 (H) 10/10/2019    HDL 56 10/10/2019    TRIG 137 10/10/2019    BUN 19 03/23/2018     03/23/2018    CO2 29 03/23/2018    Lab Results   Component Value Date    WBC 6.6 03/23/2018    HGB 12.8 (L) 03/23/2018    HCT 39.2 (L) 03/23/2018    MCV 91 03/23/2018     03/23/2018    No results found for: CKTOTAL, CKMB, TROPONINI, BNP, TSH, INR           Thank you for allowing me to participate in the care of your patient.      Sincerely,     Higinio Fabian MD     [facility]  cc:   Higinio Fabian MD  [address] [place] [address]

## 2022-06-27 DIAGNOSIS — I48.3 TYPICAL ATRIAL FLUTTER (H): ICD-10-CM

## 2022-06-27 RX ORDER — METOPROLOL SUCCINATE 50 MG/1
50 TABLET, EXTENDED RELEASE ORAL DAILY
Qty: 90 TABLET | Refills: 1 | Status: SHIPPED | OUTPATIENT
Start: 2022-06-27 | End: 2022-12-15

## 2023-12-23 DIAGNOSIS — I48.3 TYPICAL ATRIAL FLUTTER (H): ICD-10-CM

## 2023-12-26 RX ORDER — METOPROLOL SUCCINATE 50 MG/1
TABLET, EXTENDED RELEASE ORAL
Qty: 90 TABLET | Refills: 1 | Status: SHIPPED | OUTPATIENT
Start: 2023-12-26 | End: 2024-06-05

## 2024-06-05 DIAGNOSIS — I48.3 TYPICAL ATRIAL FLUTTER (H): ICD-10-CM

## 2024-06-05 RX ORDER — METOPROLOL SUCCINATE 50 MG/1
TABLET, EXTENDED RELEASE ORAL
Qty: 90 TABLET | Refills: 0 | Status: SHIPPED | OUTPATIENT
Start: 2024-06-05